# Patient Record
Sex: MALE | Race: BLACK OR AFRICAN AMERICAN | HISPANIC OR LATINO | URBAN - METROPOLITAN AREA
[De-identification: names, ages, dates, MRNs, and addresses within clinical notes are randomized per-mention and may not be internally consistent; named-entity substitution may affect disease eponyms.]

---

## 2017-10-06 ENCOUNTER — OFFICE VISIT (OUTPATIENT)
Dept: FAMILY MEDICINE | Facility: CLINIC | Age: 32
End: 2017-10-06

## 2017-10-06 VITALS
SYSTOLIC BLOOD PRESSURE: 112 MMHG | WEIGHT: 143.6 LBS | HEART RATE: 71 BPM | TEMPERATURE: 98.3 F | DIASTOLIC BLOOD PRESSURE: 66 MMHG

## 2017-10-06 DIAGNOSIS — K04.7 TOOTH ABSCESS: Primary | ICD-10-CM

## 2017-10-06 RX ORDER — OXYCODONE HYDROCHLORIDE 5 MG/1
5 TABLET ORAL EVERY 4 HOURS PRN
Qty: 15 TABLET | Refills: 0 | Status: SHIPPED | OUTPATIENT
Start: 2017-10-06

## 2017-10-06 ASSESSMENT — ENCOUNTER SYMPTOMS
RHINORRHEA: 0
SHORTNESS OF BREATH: 0
FEVER: 0
SINUS PAIN: 0
LIGHT-HEADEDNESS: 0
DIZZINESS: 0
CHILLS: 0

## 2017-10-06 NOTE — PATIENT INSTRUCTIONS
Take augmentin every 12 hours for the next 10 days.    Schedule dental appointment.    Use oxycodone sparingly for breakthrough pain, can otherwise take tylenol or naproxen.    Return if things are not improving or if you develop swelling or fevers.

## 2017-10-06 NOTE — MR AVS SNAPSHOT
After Visit Summary   10/6/2017    Antonio Ferrara    MRN: [id number]           Patient Information     Date Of Birth          1985        Visit Information        Provider Department      10/6/2017 11:00 AM Elham Cordova DO [facility]        Today's Diagnoses     Tooth abscess    -  1      Care Instructions    Take augmentin every 12 hours for the next 10 days.    Schedule dental appointment.    Use oxycodone sparingly for breakthrough pain, can otherwise take tylenol or naproxen.    Return if things are not improving or if you develop swelling or fevers.          Follow-ups after your visit        Who to contact     Please call your clinic at [phone] to:    Ask questions about your health    Make or cancel appointments    Discuss your medicines    Learn about your test results    Speak to your doctor   If you have compliments or concerns about an experience at your clinic, or if you wish to file a complaint, please contact [facility] Patient Relations at [phone] or email us at [email or web address]         Additional Information About Your Visit        MyChart Information     iKONVERSE is an electronic gateway that provides easy, online access to your medical records. With iKONVERSE, you can request a clinic appointment, read your test results, renew a prescription or communicate with your care team.     To sign up for iKONVERSE visit the website at [email or web address]   You will be asked to enter the access code listed below, as well as some personal information. Please follow the directions to create your username and password.     Your access code is: [email or web address]  Expires: 2018 12:13 PM     Your access code will  in 90 days. If you need help or a new code, please contact your [facility] or call [phone] for assistance.        Care EveryWhere ID     This is your Care EveryWhere ID.  This could be used by other organizations to access your [place] medical records  [id number]        Your Vitals Were     Pulse Temperature                71 98.3  F (36.8  C) (Oral)           Blood Pressure from Last 3 Encounters:   10/06/17 112/66    Weight from Last 3 Encounters:   10/06/17 143 lb 9.6 oz (65.1 kg)              Today, you had the following     No orders found for display         Today's Medication Changes          These changes are accurate as of: 10/6/17 12:13 PM.  If you have any questions, ask your nurse or doctor.               Start taking these medicines.        Dose/Directions    amoxicillin-clavulanate 875-125 MG per tablet   Commonly known as:  AUGMENTIN   Used for:  Tooth abscess   Started by:  Elham Cordova DO        Dose:  1 tablet   Take 1 tablet by mouth 2 times daily   Quantity:  20 tablet   Refills:  0       oxyCODONE 5 MG IR tablet   Commonly known as:  ROXICODONE   Used for:  Tooth abscess   Started by:  Elham Cordova DO        Dose:  5 mg   Take 1 tablet (5 mg) by mouth every 4 hours as needed for pain   Quantity:  15 tablet   Refills:  0            Where to get your medicines      These medications were sent to [facility] - [place], [place] - [address], [place] [address]     Phone:  [phone]     amoxicillin-clavulanate 875-125 MG per tablet         Some of these will need a paper prescription and others can be bought over the counter.  Ask your nurse if you have questions.     Bring a paper prescription for each of these medications     oxyCODONE 5 MG IR tablet                Primary Care Provider Office Phone # Fax #    Elham Cordova -[phone]       [address] [address]        Equal Access to Services     GENARO YOUSSEF AH: Mary Vargas, watraceda luqadaha, qaybta kaalmada xi, alexander levine. So [facility]  [phone].    ATENCIÓN: Si carolynn trammell, tiene a padilla disposición servicios gratuitos de asistencia lingüística. Thomas calix [phone].    We comply with applicable federal civil rights laws and [place] laws. We do not discriminate on the basis of race, color, national origin, age, disability, sex, sexual orientation, or gender identity.            Thank you!     Thank you for choosing [facility]  for your care. Our goal is always to provide you with excellent care. Hearing back from our patients is one way we can continue to improve our services. Please take a few minutes to complete the written survey that you may receive in the mail after your visit with us. Thank you!             Your Updated Medication List - Protect others around you: Learn how to safely use, store and throw away your medicines at www.disposemymeds.org.          This list is accurate as of: 10/6/17 12:13 PM.  Always use your most recent med list.                   Brand Name Dispense Instructions for use Diagnosis    amoxicillin-clavulanate 875-125 MG per tablet    AUGMENTIN    20 tablet    Take 1 tablet by mouth 2 times daily    Tooth abscess       oxyCODONE 5 MG IR tablet    ROXICODONE    15 tablet    Take 1 tablet (5 mg) by mouth every 4 hours as needed for pain    Tooth abscess

## 2017-10-07 NOTE — PROGRESS NOTES
HPI:       Antonio Ferrara is a 32 year old who presents for tooth pain. He is new to our clinic. 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 He presents with his fiance who gives some of this history. Patient says he started having pain in his right upper teeth about two days ago and it has gotten progressively worse. No drainage. Tender when he opens his mouth or palpates the area. He hasn't had anything like this before. He does have insurance, but hasn't scheduled a dental appointment yet. He denies fevers, chills. He tried some aleve, but it didn't help. No medication allergies.     Problem, Medication and Allergy Lists were reviewed and are current.  Patient is an established patient of this clinic.         Review of Systems:   Review of Systems   Constitutional: Negative for chills and fever.   HENT: Positive for dental problem. Negative for rhinorrhea and sinus pain.    Respiratory: Negative for shortness of breath.    Neurological: Negative for dizziness and light-headedness.             Physical Exam:   Patient Vitals for the past 24 hrs:   BP Temp Temp src Pulse Height Weight   10/06/17 1141 112/66 98.3  F (36.8  C) Oral 71 - 143 lb 9.6 oz (65.1 kg)     There is no height or weight on file to calculate BMI.  Vitals were reviewed and were normal     Physical Exam   Constitutional: He is oriented to person, place, and time. He appears well-developed. He appears distressed.   HENT:   Head: Normocephalic.   Right upper molars with significant surrounding edema and what appears to be abscess formation. No drainage seen. Significant tenderness to palpation. No trismus. Oropharynx widely patent. No tenderness of palpation of neck and no underlying fluctuance palpated.   Neurological: He is alert and oriented to person, place, and time.       Results:      Results from the last 24 hoursNo results found for this or any previous visit (from the past 24 hour(s)).  Assessment and Plan      1. Tooth abscess  - able to open mouth at least two fingerbreadth's, no concern for pharyngeal abscess or deep space infection at this time  - offered I&D of abscess to relieve pressure, but he declines today   - checked , no prescriptions in the last year in any of states available for search (including here and [place]), I feel comfortable giving him a short dose of narcotic pain medicine   - amoxicillin-clavulanate (AUGMENTIN) 875-125 MG per tablet; Take 1 tablet by mouth 2 times daily  Dispense: 20 tablet; Refill: 0  - oxyCODONE (ROXICODONE) 5 MG IR tablet; Take 1 tablet (5 mg) by mouth every 4 hours as needed for pain  Dispense: 15 tablet; Refill: 0  - schedule dental visit as soon as possible (given dental resources)    There are no discontinued medications.  Options for treatment and follow-up care were reviewed with the patient. Antonio Ferrara  engaged in the decision making process and verbalized understanding of the options discussed and agreed with the final plan.    Elham Cordova, PGY 3  Family Medicine Resident  [facility]

## 2017-10-09 NOTE — PROGRESS NOTES
Preceptor attestation:  Patient seen and discussed with the resident. Assessment and plan reviewed with resident and agreed upon.  Supervising physician: Slava Morin  [facility]

## 2021-08-10 ENCOUNTER — OFFICE VISIT (OUTPATIENT)
Dept: FAMILY MEDICINE | Facility: CLINIC | Age: 36
End: 2021-08-10
Payer: COMMERCIAL

## 2021-08-10 VITALS
HEART RATE: 86 BPM | OXYGEN SATURATION: 98 % | TEMPERATURE: 99.4 F | SYSTOLIC BLOOD PRESSURE: 128 MMHG | WEIGHT: 141.5 LBS | DIASTOLIC BLOOD PRESSURE: 78 MMHG

## 2021-08-10 DIAGNOSIS — R05.9 COUGH: Primary | ICD-10-CM

## 2021-08-10 PROCEDURE — 99203 OFFICE O/P NEW LOW 30 MIN: CPT | Mod: GC | Performed by: STUDENT IN AN ORGANIZED HEALTH CARE EDUCATION/TRAINING PROGRAM

## 2021-08-10 RX ORDER — BENZONATATE 100 MG/1
100 CAPSULE ORAL 3 TIMES DAILY PRN
Qty: 30 CAPSULE | Refills: 0 | Status: SHIPPED | OUTPATIENT
Start: 2021-08-10

## 2021-08-10 RX ORDER — GUAIFENESIN 200 MG/1
200 TABLET ORAL EVERY 4 HOURS PRN
Qty: 30 TABLET | Refills: 0 | Status: SHIPPED | OUTPATIENT
Start: 2021-08-10

## 2021-08-10 RX ORDER — ACETAMINOPHEN 500 MG
500 TABLET ORAL EVERY 6 HOURS PRN
Qty: 90 TABLET | Refills: 0 | Status: SHIPPED | OUTPATIENT
Start: 2021-08-10

## 2021-08-10 NOTE — PATIENT INSTRUCTIONS
1. Tylenol every 6 hours for muscle aches  2. Use the other medications to help with cough  3. You need a negative COVID test prior to returning to work  4. If you develop chest pain or trouble breathing, please go to the emergency department

## 2021-08-10 NOTE — LETTER
August 10, 2021      Antonio Ferrara  [address]  [place] [address]        To Whom It May Concern:    Antonio Ferrara was seen in our clinic on 8/10/21. He will need to stay out of work for at least the next 3 days. He will need a negative COVID19 test prior to returning to work.       Sincerely,        Izabel Norman MD

## 2021-08-10 NOTE — PROGRESS NOTES
Assessment & Plan     Cough  Vital stable.  Symptoms consistent with COVID-19, however patient declines swab today as he was tested yesterday at work and the result was negative.  Discussed that it is still possible that he has Covid even with a negative test yesterday.  Discussed conservative management with over-the-counter cough suppressants, Tylenol, and Tessalon Perles.  Discussed red flag symptoms, including chest pain and shortness of breath, that would indicate that he should be seen in the emergency department.  Provided note for work (see below).  Given he declined a Covid test today, he needs to have a negative Covid swab prior to returning to work.  This was stated in the work note.  - benzonatate (TESSALON) 100 MG capsule; Take 1 capsule (100 mg) by mouth 3 times daily as needed for cough  - guaiFENesin (ORGANIDIN) 200 MG tablet; Take 1 tablet (200 mg) by mouth every 4 hours as needed for cough  - acetaminophen (TYLENOL) 500 MG tablet; Take 1 tablet (500 mg) by mouth every 6 hours as needed for mild pain or fever    Note for work  Antonio Ferrara was seen in our clinic on 8/10/21. He will need to stay out of work for at least the next 3 days. He will need a negative COVID19 test prior to returning to work.     Return if symptoms worsen or fail to improve.    Izabel Norman MD  [facility]    Patient was seen by and discussed with attending physician, Dr. Mills.     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  No known sick contacts.  Over the last couple days he has developed a severe cough.  He is tested for Covid at work yesterday, it was negative.  He does have a history of smoking, but he has not smoked in the last 2 days because of the coughing.    Acute illness concerns: Severe cough for the last 2 days.   Symptoms:  Fever:  no  Chills/Sweats: no  Headache (location?): no  Sinus Pressure: no  Ear Pain: no  Rhinorrhea: YES  Congestion: YES  Sore Throat: no  Cough: YES  Wheeze: no  Decreased Appetite: YES  Nausea: no  Vomiting: no  Diarrhea: no  Dysuria/Freq.: no  Dysuria or Hematuria: no  Fatigue/Achiness: YES  Sick/Strep Exposure: no  Therapies tried and outcome: Tylenol, Dayquil, mucinex      Review of Systems   See HPI above      Objective    /78 (BP Location: Left arm, Patient Position: Sitting, Cuff Size: Adult Regular)   Pulse 86   Temp 99.4  F (37.4  C) (Oral)   Wt 64.2 kg (141 lb 8 oz)   SpO2 98%   There is no height or weight on file to calculate BMI.  Physical Exam   GENERAL: fatigued, alert  EYES: Eyes grossly normal to inspection and conjunctiva/corneas- conjunctival injection bilaterally  RESP: Persistent cough. Lungs clear to auscultation - no rales, rhonchi or wheezes.  CV: regular rate and rhythm, normal S1 S2, no S3 or S4, no murmur, click or rub, no peripheral edema and peripheral pulses strong   MS: no gross musculoskeletal defects noted, no edema  PSYCH: mentation appears normal, affect normal/bright      ----- Service Performed and Documented by Resident or Fellow ------

## 2021-08-10 NOTE — PROGRESS NOTES
Preceptor Attestation:    I discussed the patient with the resident and evaluated the patient in person. I have verified the content of the note, which accurately reflects my assessment of the patient and the plan of care.   Supervising Physician:  Fortino Mills MD.

## 2021-08-10 NOTE — LETTER
August 10, 2021      Antonio Ferrara  [address]  [place] [address]        To Whom It May Concern:    Antonio Ferrara was seen in our clinic on 8/10/21. He will need to stay out of work for at least the next 3 days. He will need a negative COVID19 test prior to returning to work.       Sincerely,        Izabel Norman MD

## 2023-09-19 ENCOUNTER — OFFICE VISIT (OUTPATIENT)
Dept: FAMILY MEDICINE | Facility: CLINIC | Age: 38
End: 2023-09-19
Payer: COMMERCIAL

## 2023-09-19 VITALS
RESPIRATION RATE: 20 BRPM | SYSTOLIC BLOOD PRESSURE: 109 MMHG | DIASTOLIC BLOOD PRESSURE: 74 MMHG | TEMPERATURE: 98 F | OXYGEN SATURATION: 97 % | WEIGHT: 188.8 LBS | HEART RATE: 103 BPM

## 2023-09-19 DIAGNOSIS — K04.7 TOOTH ABSCESS: Primary | ICD-10-CM

## 2023-09-19 PROCEDURE — 99214 OFFICE O/P EST MOD 30 MIN: CPT | Mod: GC

## 2023-09-19 RX ORDER — AMOXICILLIN 500 MG/1
500 CAPSULE ORAL 2 TIMES DAILY
Qty: 14 CAPSULE | Refills: 0 | Status: SHIPPED | OUTPATIENT
Start: 2023-09-19 | End: 2023-09-26

## 2023-09-19 RX ORDER — NAPROXEN 500 MG/1
500 TABLET ORAL EVERY 6 HOURS PRN
Qty: 90 TABLET | Refills: 0 | Status: SHIPPED | OUTPATIENT
Start: 2023-09-19

## 2023-09-19 RX ORDER — ACETAMINOPHEN 500 MG
500-1000 TABLET ORAL EVERY 6 HOURS PRN
Qty: 90 TABLET | Refills: 0 | Status: SHIPPED | OUTPATIENT
Start: 2023-09-19

## 2023-09-19 NOTE — LETTER
[facility] [address] [place] [address]  Phone: [phone]  Fax: [phone]    September 19, 2023        Antonio Ferrara  [address]  [place] [address]          To whom it may concern:    RE: Antonio Ferrara    Patient was seen and treated today at our clinic.    Please contact me for questions or concerns.      Sincerely,        Josey Cyr MD

## 2023-09-19 NOTE — PROGRESS NOTES
Assessment & Plan     1. Tooth abscess  Worsening pain and swelling, abscess right upper back teeth.  Pain is severe, no fevers.  No discharge.  Patient has not seen a dentist.  Excepted dental list.  No known allergies.  No recent antibiotic use.  - naproxen (NAPROSYN) 500 MG tablet; Take 1 tablet (500 mg) by mouth every 6 hours as needed for moderate pain  Dispense: 90 tablet; Refill: 0  - acetaminophen (TYLENOL) 500 MG tablet; Take 1-2 tablets (500-1,000 mg) by mouth every 6 hours as needed for mild pain  Dispense: 90 tablet; Refill: 0  - amoxicillin (AMOXIL) 500 MG capsule; Take 1 capsule (500 mg) by mouth 2 times daily for 7 days  Dispense: 14 capsule; Refill: 0  -Patient to follow-up with dentist, dental list provided  -Patient can follow-up in clinic if issues getting into see the dentist, worsening symptoms or issues with antibiotics    Josey Cyr MD  [facility]   Antonio is a 38 year old, presenting for the following health issues:  Other (Tooth pain, letter to work)      9/19/2023     2:35 PM   Additional Questions   Roomed by gordo   Accompanied by self       HPI     Pain in R sided upper mouth. Severe pain. No fevers. No dentist. Tylenol not helping      Review of Systems   As above      Objective    /74   Pulse 103   Temp 98  F (36.7  C) (Oral)   Resp 20   Wt 85.6 kg (188 lb 12.8 oz)   SpO2 97%   There is no height or weight on file to calculate BMI.  Physical Exam  Constitutional:       Comments: Appears in pain   HENT:      Nose: Nose normal.      Mouth/Throat:      Dentition: Dental abscesses present.     Pulmonary:      Effort: Pulmonary effort is normal. No respiratory distress.   Neurological:      General: No focal deficit present.      Mental Status: He is alert and oriented to person, place, and time.   Psychiatric:         Mood and Affect: Mood normal.         Behavior: Behavior normal.

## 2023-09-19 NOTE — PROGRESS NOTES
Preceptor Attestation:    I discussed the patient with the resident and evaluated the patient in person. I have verified the content of the note, which accurately reflects my assessment of the patient and the plan of care.   Supervising Physician:  Sammy Spence MD.